# Patient Record
Sex: MALE | Race: WHITE | URBAN - METROPOLITAN AREA
[De-identification: names, ages, dates, MRNs, and addresses within clinical notes are randomized per-mention and may not be internally consistent; named-entity substitution may affect disease eponyms.]

---

## 2018-03-27 ENCOUNTER — TELEPHONE (OUTPATIENT)
Dept: TRANSPLANT | Facility: CLINIC | Age: 58
End: 2018-03-27

## 2018-03-27 DIAGNOSIS — Z00.5 TRANSPLANT DONOR EVALUATION: Primary | ICD-10-CM

## 2018-03-27 NOTE — TELEPHONE ENCOUNTER
"[facility]ZE  [id number]      LIVING KIDNEY DONOR EVALUATION  Donor First Name Ervin Donor MRN    Donor Last Name Elvira Completed 3/26/2018 10:23 AM    1960 Record ID [id number]   BREEZE Screen PASSED     Intended Recipient  Recipient First Name Rayna Recipient MRN    Recipient Last Name Edison Relationship Not related (Other)   Recipient  1960 Recipient Diagnosis    Recipient's ABO      Donor Information  Age 57 Gender Male   Height 6' 1'' Race    Weight 225 Ethnicity Not /   BMI 29.7 Preferred Language English      Required No     Blood Type Unknown   Demographics  Home Address [address] [place] [address] # [phone]   [facility] Type [facility] Alternate #    Zip Code [address] Type    Country [place] Preferred Contact day Mon, Fri, Thur, Wed, Tue   Email [email or web address] Preferred Contact time 11:00 AM-1:00 PM, 1:00 PM-4:00 PM, 09:00 AM-11:00 AM   Donor's Medical Information  Medical History None Reported Medications None Reported   Surgical History None Reported Allergies NKDA   Social History EtOH: Rare (1-2 drinks/month)   Illicit Drug Use: Denies   Tobacco: Denies Self-Reported Functional Status \"I am able to participate in moderate recreational activities like golf, double tennis, dancing, throwing a baseball or football\"   Family Medical History Cancer (denies)   Diabetes (denies)   Heart Disease (denies)   Hypertension (denies)   Kidney Disease (denies)   Kidney Stones (denies) Exercise Frequency Exercise (Not on a regular basis)   Review of Organ Systems  Review of Systems Airway or Lungs: No   Blood Disorder: No   Cancer: No   Diabetes,Thyroid,Adrenal,Endocrine Disorder: No   Digestive or Liver: No   Heart or Circulatory System: No   Immune Diseases: No   Kidneys and Bladder: No   Male Health: No   Muscles,Bones,Joints: No   Neuro: No   Psych: No   Donor's Social Information  Marital Status Single Living Accommodation " Owns own home/apartment   Level of Education High school or secondary school degree complete Living Arrangement Alone   Employment Status Self Employed Concerns: health and life insurance No   Employer  Concerns: job security and lost income No   Occupation      Medical Insurance Status No medical insurance      Status      High Risk Behavior  High Risk Behaviors Blood transfusion < 12 months. (NO)   Commercial sex < 12 months. (NO)   Illicit IV drug use < 5yrs. (NO)   Male:male sexual contact < 5yrs. (NO)   Other high risk sexual contact < 12 months. (NO)   Reason for Donation  Referral Tx Candidate Reason for Donation Helping out a friend   Permission to Disclose Inquiry Yes Patient Comments    Donor Motivation Level Highly motivated donor     PCP Contact  PCP Name    PCP [facility]    PCP State    PCP Phone    Emergency Contact  First Name Vicky First Name    Last Name Sandeep Last Name    Phone # [phone] Phone #    Phone Type Mobile Phone Type    Relationship Friend or Other Relationship    Office Use  Reviewed By Margaux   Reviewed 3/26/2018 12:59 PM   Admin Folder Accept   Comments 3/26: Passed nathaniel- Margaux   Lost for Followup    Extended Comments    BREEZE ID [id number] survey status completed   Activity History  Call  Task    Due Date 3/26/2018   Last Modified Date/Time 3/26/2018 2:10 PM   Comments Unknown abo. Very uncertain about PEP. Will send only abo orders/pkt.

## 2018-04-03 DIAGNOSIS — Z00.5 TRANSPLANT DONOR EVALUATION: ICD-10-CM

## 2018-04-03 PROCEDURE — 86900 BLOOD TYPING SEROLOGIC ABO: CPT | Performed by: FAMILY MEDICINE

## 2018-04-03 PROCEDURE — 36415 COLL VENOUS BLD VENIPUNCTURE: CPT | Performed by: FAMILY MEDICINE

## 2018-04-05 LAB
ABO + RH BLD: NORMAL
ABO + RH BLD: NORMAL
SPECIMEN EXP DATE BLD: NORMAL